# Patient Record
Sex: MALE | Race: OTHER | NOT HISPANIC OR LATINO | URBAN - METROPOLITAN AREA
[De-identification: names, ages, dates, MRNs, and addresses within clinical notes are randomized per-mention and may not be internally consistent; named-entity substitution may affect disease eponyms.]

---

## 2021-06-16 ENCOUNTER — OFFICE (OUTPATIENT)
Dept: URBAN - METROPOLITAN AREA TELEHEALTH 3 | Facility: TELEHEALTH | Age: 51
End: 2021-06-16

## 2021-06-16 VITALS — WEIGHT: 187 LBS | HEIGHT: 64 IN

## 2021-06-16 DIAGNOSIS — Z79.1 LONG TERM (CURRENT) USE OF NON-STEROIDAL ANTI-INFLAMMATORIES: ICD-10-CM

## 2021-06-16 DIAGNOSIS — R68.81 EARLY SATIETY: ICD-10-CM

## 2021-06-16 DIAGNOSIS — R63.4 ABNORMAL WEIGHT LOSS: ICD-10-CM

## 2021-06-16 DIAGNOSIS — R07.89 OTHER CHEST PAIN: ICD-10-CM

## 2021-06-16 DIAGNOSIS — B96.81 HELICOBACTER PYLORI [H. PYLORI] AS THE CAUSE OF DISEASES CLA: ICD-10-CM

## 2021-06-16 PROCEDURE — 99204 OFFICE O/P NEW MOD 45 MIN: CPT | Performed by: PHYSICIAN ASSISTANT

## 2021-06-16 NOTE — SERVICEHPINOTES
PATIENT VERIFIED BY DATE OF BIRTH AND NAME. Patient has been consented for this telecommunication visit.   ALDAIR PAREDES   is a   50   year old    male who is being seen in consultation at the request of   ARMAND SMALL   for chest pain. His history is told by his daughter. He has chest pain and has been evaluated by cardiology and evaluation is negative--saw Dr. Agrawal. I spoke to his PCP and the patient has an aneurysm. He was started on a beta blocker both to help with the aneurysm as well as to control his temper. This has helped to calm down his temper quite a lot. He will also be seeing psychiatry soon for this. GERD symptoms have been present for years. His PCP informs me that he has a poor diet and high stress levels. He also has a PMHx of h pylori. Currently, the patient denies burping, dysphagia, and anorexia. He notes early satiety. He has lost weight in part intentionally though maybe also due to early satiety. He was 223 lbs in December 2020 and now is 182 lbs. He had stomach pain at times but none at the moment. He takes Diclofenac intermittently for foot problems.  He takes a daily baby ASA. Takes ibuprofen too at times. No black stool. Of note, patient is a poor historian. History is difficult to obtain. I called his PCP who helps to tell his history. Given ongoing issues for a long period of time, an EGD is felt to be the best way to proceed now. BR